# Patient Record
(demographics unavailable — no encounter records)

---

## 2018-01-01 NOTE — [facility]
Subjective


Stated Complaint: RASH


History of Present Illness: 





5 month old female with 1 week of diaper rash. Mother is concerned about 

possible yeast infection. No diarrhea, no fevers, no illness sx. She is in good 

spirits. Has tried leaving diaper off, using lots of desitin, avoiding wipes 

and nothing seems to be helping. 





Past Medical History


Past Medical History: 





FT healthy infant, 


Otherwise healthy


Family History: 





No skin conditions in the family


Social History: 





Lives with mom, dad and brothers


2 dogs


No 


Smoking Status (MU): Never Smoked Tobacco


Household Exposure: No


Tobacco Cessation Information Provided: N/A Due to Patient Condition





JOANA Review of Systems


Constitutional: Negative


Eyes: Negative


ENT: Negative


Respiratory: Negative


Gastrointestinal: Negative


Genitourinary: Negative


Musculoskeletal: Negative


Positive: Rash


Neurological: Negative


Weight: 6.719 kg


Vital Signs: 


 Vital Signs











  18





  12:10


 


Temperature 99.3 F


 


Pulse Rate 142


 


Respiratory 30





Rate 


 


O2 Sat by Pulse 98





Oximetry 











Home Medications: 


 Home Medications











 Medication  Instructions  Recorded  Confirmed  Type


 


NK [No Home Medications Reported]  18 History














Physical Exam


General Appearance: alert, comfortable


General Appearance Description: 





happy and smiling baby


Hydration Status: mucous membranes moist, normal skin turgor, brisk capillary 

refill, extremities warm, pulses brisk


Head: normocephalic


Head Description: 





AFOF


Pupils: equal, round, react to light and accommodation


Extraocular Movement: symmetric


Conjunctivae: normal


Mouth: normal buccal mucosa, normal teeth and gums, normal tongue


Neck: supple, full range of motion


Lungs: Clear to auscultation, equal breath sounds


Heart: S1 and S2 normal, no murmurs


Abdomen: soft, no distension, no tenderness


Jeremi Stage: I


Genitals: normal labia


Neurological Description: 





awake and alert


Skin Description: 





warm and dry


brightly erythematous patches overlying the labia, perineum and buttocks with 

satellite lesions along the border


no other rash


Assessment: 





Well appearing 5 month old female with candidal diaper dermatitis. 


Plan: 





Topical Nystatin 4x daily


Bottom to air as much as possible


Avoid use of diaper wipes, clean with only warm water


Apply thick layer of Desitin over Nystatin and with every diaper change


RE-check at [facility] if rash not improving within the next 3-4 days